# Patient Record
Sex: MALE | Race: WHITE | NOT HISPANIC OR LATINO
[De-identification: names, ages, dates, MRNs, and addresses within clinical notes are randomized per-mention and may not be internally consistent; named-entity substitution may affect disease eponyms.]

---

## 2022-11-09 ENCOUNTER — DASHBOARD ENCOUNTERS (OUTPATIENT)
Age: 27
End: 2022-11-09

## 2022-11-09 ENCOUNTER — LAB OUTSIDE AN ENCOUNTER (OUTPATIENT)
Dept: URBAN - METROPOLITAN AREA CLINIC 118 | Facility: CLINIC | Age: 27
End: 2022-11-09

## 2022-11-09 ENCOUNTER — OFFICE VISIT (OUTPATIENT)
Dept: URBAN - METROPOLITAN AREA CLINIC 118 | Facility: CLINIC | Age: 27
End: 2022-11-09
Payer: MEDICARE

## 2022-11-09 VITALS
TEMPERATURE: 98.1 F | BODY MASS INDEX: 35.65 KG/M2 | WEIGHT: 249 LBS | DIASTOLIC BLOOD PRESSURE: 90 MMHG | HEART RATE: 73 BPM | HEIGHT: 70 IN | SYSTOLIC BLOOD PRESSURE: 151 MMHG

## 2022-11-09 DIAGNOSIS — R93.5 ABNORMAL ULTRASOUND OF ABDOMEN: ICD-10-CM

## 2022-11-09 DIAGNOSIS — R10.11 RUQ ABDOMINAL PAIN: ICD-10-CM

## 2022-11-09 PROCEDURE — 99204 OFFICE O/P NEW MOD 45 MIN: CPT | Performed by: INTERNAL MEDICINE

## 2022-11-09 RX ORDER — METOPROLOL TARTRATE 25 MG/1
1 TABLET WITH FOOD TABLET, FILM COATED ORAL TWICE A DAY
Status: ACTIVE | COMMUNITY

## 2022-11-09 RX ORDER — AMLODIPINE BESYLATE 5 MG/1
1 TABLET TABLET ORAL ONCE A DAY
Status: ACTIVE | COMMUNITY

## 2022-11-09 RX ORDER — ROSUVASTATIN CALCIUM 10 MG/1
1 TABLET TABLET, FILM COATED ORAL ONCE A DAY
Status: ACTIVE | COMMUNITY

## 2022-11-09 NOTE — HPI-TODAY'S VISIT:
The patient is referred by Dr. Pinedo for abdominal pain.  Note was sent.  He is a 18-year-old male who has had right axillary/right upper quadrant pain for the past year.  This started soon after an umbilical hernia repair with mesh.  There were no complication during the procedure.  He has had no severe pain requiring medications but it is primarily of discomfort especially when sleeping on that side.  He has had no other abdominal surgery other than an inguinal hernia repair.  He has had chronic reflux disease for several years and takes as needed over-the-counter medication but has had no prior upper endoscopy and states the pain is not related to the acid reflux.  He does have a family history of alcoholic liver disease but no GI related malignancy.  Of note his current weight is 250 pounds near his maximum weight of 270 pounds.  Other than an abdominal ultrasound, which showed a fatty liver, there was no significant finding on imaging.  His lab work is otherwise unremarkable.